# Patient Record
Sex: MALE | Race: AMERICAN INDIAN OR ALASKA NATIVE
[De-identification: names, ages, dates, MRNs, and addresses within clinical notes are randomized per-mention and may not be internally consistent; named-entity substitution may affect disease eponyms.]

---

## 2019-07-27 ENCOUNTER — HOSPITAL ENCOUNTER (EMERGENCY)
Dept: HOSPITAL 5 - ED | Age: 44
Discharge: HOME | End: 2019-07-27
Payer: SELF-PAY

## 2019-07-27 VITALS — DIASTOLIC BLOOD PRESSURE: 78 MMHG | SYSTOLIC BLOOD PRESSURE: 132 MMHG

## 2019-07-27 DIAGNOSIS — K52.9: Primary | ICD-10-CM

## 2019-07-27 DIAGNOSIS — E86.0: ICD-10-CM

## 2019-07-27 LAB
ALBUMIN SERPL-MCNC: 4.3 G/DL (ref 3.9–5)
ALT SERPL-CCNC: 23 UNITS/L (ref 7–56)
BASOPHILS # (AUTO): 0.1 K/MM3 (ref 0–0.1)
BASOPHILS NFR BLD AUTO: 0.8 % (ref 0–1.8)
BILIRUB UR QL STRIP: (no result)
BLOOD UR QL VISUAL: (no result)
BUN SERPL-MCNC: 19 MG/DL (ref 9–20)
BUN/CREAT SERPL: 21 %
CALCIUM SERPL-MCNC: 9.1 MG/DL (ref 8.4–10.2)
EOSINOPHIL # BLD AUTO: 0.4 K/MM3 (ref 0–0.4)
EOSINOPHIL NFR BLD AUTO: 4.9 % (ref 0–4.3)
HCT VFR BLD CALC: 42.8 % (ref 35.5–45.6)
HEMOLYSIS INDEX: 58
HGB BLD-MCNC: 15.3 GM/DL (ref 11.8–15.2)
LYMPHOCYTES # BLD AUTO: 2.9 K/MM3 (ref 1.2–5.4)
LYMPHOCYTES NFR BLD AUTO: 33.3 % (ref 13.4–35)
MCHC RBC AUTO-ENTMCNC: 36 % (ref 32–34)
MCV RBC AUTO: 93 FL (ref 84–94)
MONOCYTES # (AUTO): 0.5 K/MM3 (ref 0–0.8)
MONOCYTES % (AUTO): 5.3 % (ref 0–7.3)
MUCOUS THREADS #/AREA URNS HPF: (no result) /HPF
PH UR STRIP: 6 [PH] (ref 5–7)
PLATELET # BLD: 294 K/MM3 (ref 140–440)
PROT UR STRIP-MCNC: (no result) MG/DL
RBC # BLD AUTO: 4.63 M/MM3 (ref 3.65–5.03)
RBC #/AREA URNS HPF: 3 /HPF (ref 0–6)
UROBILINOGEN UR-MCNC: < 2 MG/DL (ref ?–2)
WBC #/AREA URNS HPF: < 1 /HPF (ref 0–6)

## 2019-07-27 PROCEDURE — 96361 HYDRATE IV INFUSION ADD-ON: CPT

## 2019-07-27 PROCEDURE — 83690 ASSAY OF LIPASE: CPT

## 2019-07-27 PROCEDURE — 96374 THER/PROPH/DIAG INJ IV PUSH: CPT

## 2019-07-27 PROCEDURE — 80053 COMPREHEN METABOLIC PANEL: CPT

## 2019-07-27 PROCEDURE — 36415 COLL VENOUS BLD VENIPUNCTURE: CPT

## 2019-07-27 PROCEDURE — 83735 ASSAY OF MAGNESIUM: CPT

## 2019-07-27 PROCEDURE — 93005 ELECTROCARDIOGRAM TRACING: CPT

## 2019-07-27 PROCEDURE — 99283 EMERGENCY DEPT VISIT LOW MDM: CPT

## 2019-07-27 PROCEDURE — 93010 ELECTROCARDIOGRAM REPORT: CPT

## 2019-07-27 PROCEDURE — 85025 COMPLETE CBC W/AUTO DIFF WBC: CPT

## 2019-07-27 PROCEDURE — 81001 URINALYSIS AUTO W/SCOPE: CPT

## 2019-07-27 NOTE — EMERGENCY DEPARTMENT REPORT
ED N/V/D HPI





- General


Chief complaint: Nausea/Vomiting/Diarrhea


Stated complaint: VOMIT/FAINTED YESTERDAY


Time Seen by Provider: 07/27/19 11:47


Source: patient


Mode of arrival: Ambulatory


Limitations: No Limitations





- History of Present Illness


Initial comments: 





44-year-old male with no past medical or surgical history presents to the 

hospital with complaints of nausea, vomiting, and diarrhea since yesterday.  

Patient works outside in hot weather and typically drinks lots of water.  

Yesterday while at work he had at least 4 episodes of vomiting and then was sent

home.  He continued to have vomiting and diarrhea and states he had a syncopal 

episode with associated lightheadedness.  He denies headache, chest pain, 

shortness of breath, abdominal pain, fevers, melena, hematochezia, hematemesis, 

sick contacts, recent travel, or recent antibiotic use.





- Related Data


                                  Previous Rx's











 Medication  Instructions  Recorded  Last Taken  Type


 


Ondansetron [Zofran Odt] 4 mg PO Q8HR PRN #20 tab.scotty 07/27/19 Unknown Rx











                                    Allergies











Allergy/AdvReac Type Severity Reaction Status Date / Time


 


No Known Allergies Allergy   Unverified 07/27/19 11:01














ED Review of Systems


ROS: 


Stated complaint: VOMIT/FAINTED YESTERDAY


Other details as noted in HPI





Comment: All other systems reviewed and negative





ED Past Medical Hx





- Past Medical History


Previous Medical History?: No





- Surgical History


Past Surgical History?: No





- Social History


Smoking Status: Never Smoker


Substance Use Type: Alcohol





- Medications


Home Medications: 


                                Home Medications











 Medication  Instructions  Recorded  Confirmed  Last Taken  Type


 


Ondansetron [Zofran Odt] 4 mg PO Q8HR PRN #20 tab.raqueldis 07/27/19  Unknown Rx














ED Physical Exam





- General


Limitations: No Limitations





- Other


Other exam information: 





General: No limitations, patient is alert in no acute distress


Head exam: Atraumatic, normocephalic


Eyes exam: Normal appearance, pupils equal reactive to light, extraocular 

movements intact


ENT: Dry mucous membrane


Neck exam: Normal inspection, full range of motion


Respiratory exam: Clear to auscultation bilateral, no wheezes, rales, crackles


Cardiovascular: Normal rate and rhythm, normal heart sounds


Abdomen: Soft, nondistended, and  nontender, with normal bowel sounds, no 

rebound, or guarding


Extremity: Full range of motion normal inspection no deformity


Back: Normal Inspection, full range of motion, no tenderness


Neurologic: Alert, oriented x3, cranial nerves intact, no motor or sensory 

deficit


Psychiatric: normal affect, normal mood


Skin: Warm, dry, intact.  First-degree sunburn skin





ED Course


                                   Vital Signs











  07/27/19 07/27/19 07/27/19





  11:01 12:07 12:09


 


Temperature 97.8 F  


 


Pulse Rate 82  66


 


Respiratory 18  18





Rate   


 


Blood Pressure 129/84  


 


Blood Pressure   121/67





[Left]   


 


O2 Sat by Pulse 95 95 96





Oximetry   














  07/27/19 07/27/19 07/27/19





  12:10 12:16 12:30


 


Temperature   


 


Pulse Rate   


 


Respiratory 18  





Rate   


 


Blood Pressure  121/67 121/67


 


Blood Pressure   





[Left]   


 


O2 Sat by Pulse 96 96 98





Oximetry   














  07/27/19 07/27/19 07/27/19





  12:46 13:00 13:16


 


Temperature   


 


Pulse Rate 68 54 L 52 L


 


Respiratory 23 15 19





Rate   


 


Blood Pressure 117/76 117/76 126/84


 


Blood Pressure   





[Left]   


 


O2 Sat by Pulse 95 98 98





Oximetry   














  07/27/19 07/27/19 07/27/19





  13:30 13:46 14:00


 


Temperature   


 


Pulse Rate 64 64 56 L


 


Respiratory 18 21 15





Rate   


 


Blood Pressure 126/84 129/62 134/72


 


Blood Pressure   





[Left]   


 


O2 Sat by Pulse 96 97 97





Oximetry   














  07/27/19 07/27/19 07/27/19





  14:16 14:30 14:46


 


Temperature   


 


Pulse Rate 72 61 


 


Respiratory 17 21 30 H





Rate   


 


Blood Pressure 129/62 138/82 138/82


 


Blood Pressure   





[Left]   


 


O2 Sat by Pulse 98 96 96





Oximetry   














  07/27/19 07/27/19





  15:00 15:16


 


Temperature  


 


Pulse Rate  


 


Respiratory  





Rate  


 


Blood Pressure 131/89 131/89


 


Blood Pressure  





[Left]  


 


O2 Sat by Pulse 98 95





Oximetry  














ED Medical Decision Making





- Lab Data


Result diagrams: 


                                 07/27/19 11:51





                                 07/27/19 11:51








                                   Lab Results











  07/27/19 07/27/19 07/27/19 Range/Units





  11:51 11:51 11:51 


 


WBC  8.8    (4.5-11.0)  K/mm3


 


RBC  4.63    (3.65-5.03)  M/mm3


 


Hgb  15.3 H    (11.8-15.2)  gm/dl


 


Hct  42.8    (35.5-45.6)  %


 


MCV  93    (84-94)  fl


 


MCH  33 H    (28-32)  pg


 


MCHC  36 H    (32-34)  %


 


RDW  13.6    (13.2-15.2)  %


 


Plt Count  294    (140-440)  K/mm3


 


Lymph % (Auto)  33.3    (13.4-35.0)  %


 


Mono % (Auto)  5.3    (0.0-7.3)  %


 


Eos % (Auto)  4.9 H    (0.0-4.3)  %


 


Baso % (Auto)  0.8    (0.0-1.8)  %


 


Lymph #  2.9    (1.2-5.4)  K/mm3


 


Mono #  0.5    (0.0-0.8)  K/mm3


 


Eos #  0.4    (0.0-0.4)  K/mm3


 


Baso #  0.1    (0.0-0.1)  K/mm3


 


Seg Neutrophils %  55.7    (40.0-70.0)  %


 


Seg Neutrophils #  4.9    (1.8-7.7)  K/mm3


 


Sodium   140   (137-145)  mmol/L


 


Potassium   4.6   (3.6-5.0)  mmol/L


 


Chloride   107.3 H   ()  mmol/L


 


Carbon Dioxide   22   (22-30)  mmol/L


 


Anion Gap   15   mmol/L


 


BUN   19   (9-20)  mg/dL


 


Creatinine   0.9   (0.8-1.5)  mg/dL


 


Estimated GFR   > 60   ml/min


 


BUN/Creatinine Ratio   21   %


 


Glucose   96   ()  mg/dL


 


Calcium   9.1   (8.4-10.2)  mg/dL


 


Magnesium    2.20  (1.7-2.3)  mg/dL


 


Total Bilirubin   0.40   (0.1-1.2)  mg/dL


 


AST   21   (5-40)  units/L


 


ALT   23   (7-56)  units/L


 


Alkaline Phosphatase   100   ()  units/L


 


Total Protein   7.2   (6.3-8.2)  g/dL


 


Albumin   4.3   (3.9-5)  g/dL


 


Albumin/Globulin Ratio   1.5   %


 


Lipase   30   (13-60)  units/L


 


Urine Color     (Yellow)  


 


Urine Turbidity     (Clear)  


 


Urine pH     (5.0-7.0)  


 


Ur Specific Gravity     (1.003-1.030)  


 


Urine Protein     (Negative)  mg/dL


 


Urine Glucose (UA)     (Negative)  mg/dL


 


Urine Ketones     (Negative)  mg/dL


 


Urine Blood     (Negative)  


 


Urine Nitrite     (Negative)  


 


Urine Bilirubin     (Negative)  


 


Urine Urobilinogen     (<2.0)  mg/dL


 


Ur Leukocyte Esterase     (Negative)  


 


Urine WBC (Auto)     (0.0-6.0)  /HPF


 


Urine RBC (Auto)     (0.0-6.0)  /HPF


 


Urine Mucus     /HPF














  07/27/19 Range/Units





  14:41 


 


WBC   (4.5-11.0)  K/mm3


 


RBC   (3.65-5.03)  M/mm3


 


Hgb   (11.8-15.2)  gm/dl


 


Hct   (35.5-45.6)  %


 


MCV   (84-94)  fl


 


MCH   (28-32)  pg


 


MCHC   (32-34)  %


 


RDW   (13.2-15.2)  %


 


Plt Count   (140-440)  K/mm3


 


Lymph % (Auto)   (13.4-35.0)  %


 


Mono % (Auto)   (0.0-7.3)  %


 


Eos % (Auto)   (0.0-4.3)  %


 


Baso % (Auto)   (0.0-1.8)  %


 


Lymph #   (1.2-5.4)  K/mm3


 


Mono #   (0.0-0.8)  K/mm3


 


Eos #   (0.0-0.4)  K/mm3


 


Baso #   (0.0-0.1)  K/mm3


 


Seg Neutrophils %   (40.0-70.0)  %


 


Seg Neutrophils #   (1.8-7.7)  K/mm3


 


Sodium   (137-145)  mmol/L


 


Potassium   (3.6-5.0)  mmol/L


 


Chloride   ()  mmol/L


 


Carbon Dioxide   (22-30)  mmol/L


 


Anion Gap   mmol/L


 


BUN   (9-20)  mg/dL


 


Creatinine   (0.8-1.5)  mg/dL


 


Estimated GFR   ml/min


 


BUN/Creatinine Ratio   %


 


Glucose   ()  mg/dL


 


Calcium   (8.4-10.2)  mg/dL


 


Magnesium   (1.7-2.3)  mg/dL


 


Total Bilirubin   (0.1-1.2)  mg/dL


 


AST   (5-40)  units/L


 


ALT   (7-56)  units/L


 


Alkaline Phosphatase   ()  units/L


 


Total Protein   (6.3-8.2)  g/dL


 


Albumin   (3.9-5)  g/dL


 


Albumin/Globulin Ratio   %


 


Lipase   (13-60)  units/L


 


Urine Color  Yellow  (Yellow)  


 


Urine Turbidity  Clear  (Clear)  


 


Urine pH  6.0  (5.0-7.0)  


 


Ur Specific Gravity  1.020  (1.003-1.030)  


 


Urine Protein  <15 mg/dl  (Negative)  mg/dL


 


Urine Glucose (UA)  Neg  (Negative)  mg/dL


 


Urine Ketones  Neg  (Negative)  mg/dL


 


Urine Blood  Neg  (Negative)  


 


Urine Nitrite  Neg  (Negative)  


 


Urine Bilirubin  Neg  (Negative)  


 


Urine Urobilinogen  < 2.0  (<2.0)  mg/dL


 


Ur Leukocyte Esterase  Neg  (Negative)  


 


Urine WBC (Auto)  < 1.0  (0.0-6.0)  /HPF


 


Urine RBC (Auto)  3.0  (0.0-6.0)  /HPF


 


Urine Mucus  Few  /HPF














- EKG Data


-: EKG Interpreted by Me


EKG shows normal: sinus rhythm, ST-T waves (no stemi)


Rate: normal





- EKG Data


When compared to previous EKG there are: previous EKG unavailable





- Medical Decision Making





Patient required greater than 2 L of normal saline to produce urine.  He feels 

much better with hydration and Zofran and is tolerating by mouth intake.  

Discharged and treated for gastroenteritis.





- Differential Diagnosis


gastroenteritis, pancreatitis, dehydration, arrhythmia, electrolyte abnorma


Critical Care Time: No


Critical care attestation.: 


If time is entered above; I have spent that time in minutes in the direct care 

of this critically ill patient, excluding procedure time.








ED Disposition


Clinical Impression: 


 Gastroenteritis, Dehydration





Disposition: DC-01 TO HOME OR SELFCARE


Is pt being admited?: No


Does the pt Need Aspirin: No


Condition: Stable


Instructions:  Gastroenteritis (ED), Dehydration (ED)


Additional Instructions: 


Take the medication as prescribed.  Follow up with your doctor or the 

clinic/doctor provided.  Return if symptoms worsen as indicated by your disc

harge instructions


Prescriptions: 


Ondansetron [Zofran Odt] 4 mg PO Q8HR PRN #20 tab.rapdis


 PRN Reason: Nausea And Vomiting


Referrals: 


[facility], [place] [Primary Care Provider] - 3-5 Days


Time of Disposition: 16:06

## 2021-12-20 ENCOUNTER — OFFICE VISIT (OUTPATIENT)
Dept: URGENT CARE | Facility: CLINIC | Age: 46
End: 2021-12-20
Payer: COMMERCIAL

## 2021-12-20 VITALS
HEIGHT: 74 IN | HEART RATE: 86 BPM | RESPIRATION RATE: 14 BRPM | OXYGEN SATURATION: 98 % | WEIGHT: 226 LBS | SYSTOLIC BLOOD PRESSURE: 122 MMHG | BODY MASS INDEX: 29 KG/M2 | TEMPERATURE: 98.9 F | DIASTOLIC BLOOD PRESSURE: 86 MMHG

## 2021-12-20 DIAGNOSIS — U07.1 COVID-19: ICD-10-CM

## 2021-12-20 DIAGNOSIS — R05.9 COUGH: ICD-10-CM

## 2021-12-20 DIAGNOSIS — R68.89 FLU-LIKE SYMPTOMS: ICD-10-CM

## 2021-12-20 LAB
EXTERNAL QUALITY CONTROL: NORMAL
FLUAV+FLUBV AG SPEC QL IA: NEGATIVE
INT CON NEG: NEGATIVE
INT CON POS: POSITIVE
SARS-COV+SARS-COV-2 AG RESP QL IA.RAPID: POSITIVE

## 2021-12-20 PROCEDURE — 99204 OFFICE O/P NEW MOD 45 MIN: CPT | Mod: CS | Performed by: PHYSICIAN ASSISTANT

## 2021-12-20 PROCEDURE — 87426 SARSCOV CORONAVIRUS AG IA: CPT | Mod: QW | Performed by: PHYSICIAN ASSISTANT

## 2021-12-20 PROCEDURE — 87804 INFLUENZA ASSAY W/OPTIC: CPT | Performed by: PHYSICIAN ASSISTANT

## 2021-12-20 RX ORDER — BENZONATATE 100 MG/1
100 CAPSULE ORAL 3 TIMES DAILY PRN
Qty: 60 CAPSULE | Refills: 0 | Status: SHIPPED | OUTPATIENT
Start: 2021-12-20

## 2021-12-20 RX ORDER — DEXTROMETHORPHAN HYDROBROMIDE AND PROMETHAZINE HYDROCHLORIDE 15; 6.25 MG/5ML; MG/5ML
5 SYRUP ORAL
Qty: 50 ML | Refills: 0 | Status: SHIPPED | OUTPATIENT
Start: 2021-12-20

## 2021-12-20 ASSESSMENT — ENCOUNTER SYMPTOMS
CHILLS: 1
SORE THROAT: 1
SHORTNESS OF BREATH: 0
SINUS PAIN: 1
CONSTIPATION: 0
VOMITING: 0
MYALGIAS: 1
COUGH: 1
SPUTUM PRODUCTION: 1
ABDOMINAL PAIN: 0
WHEEZING: 0
FEVER: 1
DIARRHEA: 0
HEADACHES: 1

## 2021-12-20 NOTE — LETTER
December 20, 2021  Gerry Skelton   Your employee was seen in our clinic today.  COVID-19 testing done in Urgent Care today was POSITIVE. We are asking you to excuse absences while following self-isolation protocol per Center for Disease Control (CDC) guidelines.  Your employee will be able to access test results through our electronic delivery system called [employer].     If the results of testing are positive, your employee should follow the CDC guidelines.  These are isolation for a minimum of 10 days and at least 24 hours have passed since your last fever without the use of fever-reducing medications and all other symptoms have improved.  The health department may contact you and provide further directions regarding self-isolation and return to work.     Negative result without close contact*:  If your employee was tested due to their symptoms without close contact to someone with COVID-19 and their test is negative: your employee should not return to work or regular activities until 24 hours after symptoms fully improve. (For example, if patient feels back to normal on Tuesday, should remain isolated through Wednesday).      Negative with close contact*:  If your employee was tested due to close contact to someone, they should self isolate for 10 days after their exposure.    Negative with positive household member  If your employee was due to a positive household member they should still quarantine for a period of 10 days.  The 10 day period begins once the household member is isolated. If unable to quarantine (for example mom from infant), the CDC advises an additional 10-day quarantine period from the COVID-19 household member.   In general, repeat testing is not necessary and not offered through our [facility].     This is the only note that will be provided from [facility] for this visit.  Your employee will require an appointment with a primary care provider if FMLA or disability forms  are required.  If you have any questions please do not hesitate to call me at the phone number listed below.    Sincerely,  TERENCE Suh.-C.  [phone]

## 2021-12-20 NOTE — PROGRESS NOTES
"Subjective:   Gerry Skelton is a 46 y.o. male who presents for Headache (x 4 days with fatigue, cough, nasal congestion, \"eyes\" pain, chills and fever.  Unvaccinated for Covid 19. )      HPI  46 y.o. male presents to urgent care with new problem to provider of headache, cough, congestion, fatigue, chills, and subjective fevers onset 4 days ago.  Patient denies chest pain or shortness of breath.  He denies abdominal pain, vomiting, or diarrhea.  He denies sick contacts or confirmed exposure to COVID-19.  Patient is not vaccinated for COVID. Denies other associated aggravating or alleviating factors.     Review of Systems   Constitutional: Positive for chills, fever and malaise/fatigue.   HENT: Positive for congestion, sinus pain and sore throat. Negative for ear pain.    Respiratory: Positive for cough and sputum production. Negative for shortness of breath and wheezing.    Cardiovascular: Negative for chest pain.   Gastrointestinal: Negative for abdominal pain, constipation, diarrhea and vomiting.   Musculoskeletal: Positive for myalgias.   Skin: Negative for rash.   Neurological: Positive for headaches.       There is no problem list on file for this patient.    History reviewed. No pertinent surgical history.  Social History     Tobacco Use   • Smoking status: Current Every Day Smoker   • Smokeless tobacco: Never Used   Vaping Use   • Vaping Use: Never used   Substance Use Topics   • Alcohol use: Yes   • Drug use: Never      History reviewed. No pertinent family history.   (Allergies, Medications, & Tobacco/Substance Use were reconciled by the Medical Assistant and reviewed by myself. The family history is prepopulated)     Objective:     /86   Pulse 86   Temp 37.2 °C (98.9 °F) (Temporal)   Resp 14   Ht 1.88 m (6' 2\")   Wt 103 kg (226 lb)   SpO2 98%   BMI 29.02 kg/m²     Physical Exam  Vitals reviewed.   Constitutional:       General: He is not in acute distress.     Appearance: Normal " appearance. He is not ill-appearing or diaphoretic.   HENT:      Head: Normocephalic and atraumatic.      Nose: Nose normal.      Mouth/Throat:      Mouth: Mucous membranes are moist.      Pharynx: Posterior oropharyngeal erythema present. No oropharyngeal exudate.   Eyes:      Conjunctiva/sclera: Conjunctivae normal.   Cardiovascular:      Rate and Rhythm: Normal rate and regular rhythm.      Heart sounds: Normal heart sounds. No murmur heard.  No friction rub. No gallop.    Pulmonary:      Effort: Pulmonary effort is normal. No respiratory distress.      Breath sounds: Normal breath sounds. No wheezing, rhonchi or rales.   Musculoskeletal:         General: Normal range of motion.      Cervical back: Normal range of motion and neck supple.   Lymphadenopathy:      Cervical: Cervical adenopathy present.   Skin:     General: Skin is warm and dry.      Findings: No rash.   Neurological:      General: No focal deficit present.      Mental Status: He is alert and oriented to person, place, and time.   Psychiatric:         Mood and Affect: Mood normal.         Behavior: Behavior normal.         Thought Content: Thought content normal.         Judgment: Judgment normal.         Assessment/Plan:     1. COVID-19  POCT SARS-COV Antigen RITA (Symptomatic Only)    CANCELED: COVID/SARS CoV-2 PCR   2. Flu-like symptoms  POCT Influenza A/B   3. Cough  benzonatate (TESSALON) 100 MG Cap    promethazine-dextromethorphan (PROMETHAZINE-DM) 6.25-15 MG/5ML syrup     Results for orders placed or performed in visit on 12/20/21   POCT SARS-COV Antigen RITA (Symptomatic Only)   Result Value Ref Range    Internal  Valid     SARS-COV ANTIGEN RITA Positive    POCT Influenza A/B   Result Value Ref Range    Rapid Influenza A-B Negative     Internal Control Positive Positive     Internal Control Negative Negative      Patient is positive for COVID-19.  He has had 4 days of symptoms.  Patient instructed to continue  self-isolate/quarantine per CDC guidelines. patient may obtain hard copy of results on [facility]. Increased fluids and rest. Discussed use of OTC cough and cold medication and Tylenol/Motrin for symptomatic relief.  Return for reevaluation or proceed to ED if symptoms persist or worsen. Supportive care, differential diagnoses, and indications for immediate follow-up discussed with patient. Patient should to proceed to ED for development of symptoms including but not limited to shortness of breath breath, difficulty breathing, or worsening symptoms not manageable at home.   Vital signs stable, patient in no acute respiratory distress.  COVID-19 discharge instructions and CDC guidelines provided to patient in AVS.      Differential diagnosis, natural history, supportive care, and indications for immediate follow-up discussed.    Advised the patient to follow-up with the primary care physician for recheck, reevaluation, and consideration of further management.  Patient verbalized understanding of treatment plan and has no further questions regarding care.   This patient is evaluated under [facility] isolation protocols in urgent care.  Out of an abundance of caution I am wearing a N95 mask, protective eye gear, and gloves through all interaction with patient.    I personally reviewed prior external notes and test results pertinent to today's visit.     Please note that this dictation was created using voice recognition software. I have made a reasonable attempt to correct obvious errors, but I expect that there are errors of grammar and possibly content that I did not discover before finalizing the note.    This note was electronically signed by Shana Hardwick PA-C

## 2022-02-15 ENCOUNTER — APPOINTMENT (OUTPATIENT)
Dept: RADIOLOGY | Facility: MEDICAL CENTER | Age: 47
End: 2022-02-15
Attending: EMERGENCY MEDICINE
Payer: COMMERCIAL

## 2022-02-15 ENCOUNTER — HOSPITAL ENCOUNTER (EMERGENCY)
Facility: MEDICAL CENTER | Age: 47
End: 2022-02-15
Attending: EMERGENCY MEDICINE
Payer: COMMERCIAL

## 2022-02-15 VITALS
OXYGEN SATURATION: 98 % | DIASTOLIC BLOOD PRESSURE: 86 MMHG | BODY MASS INDEX: 26.82 KG/M2 | HEIGHT: 74 IN | SYSTOLIC BLOOD PRESSURE: 134 MMHG | TEMPERATURE: 97.2 F | HEART RATE: 74 BPM | WEIGHT: 209 LBS | RESPIRATION RATE: 15 BRPM

## 2022-02-15 DIAGNOSIS — S51.811A LACERATION OF RIGHT FOREARM, INITIAL ENCOUNTER: ICD-10-CM

## 2022-02-15 DIAGNOSIS — S92.534A CLOSED NONDISPLACED FRACTURE OF DISTAL PHALANX OF LESSER TOE OF RIGHT FOOT, INITIAL ENCOUNTER: ICD-10-CM

## 2022-02-15 PROCEDURE — 700111 HCHG RX REV CODE 636 W/ 250 OVERRIDE (IP): Performed by: EMERGENCY MEDICINE

## 2022-02-15 PROCEDURE — 73090 X-RAY EXAM OF FOREARM: CPT | Mod: RT

## 2022-02-15 PROCEDURE — 90471 IMMUNIZATION ADMIN: CPT

## 2022-02-15 PROCEDURE — 73660 X-RAY EXAM OF TOE(S): CPT | Mod: RT

## 2022-02-15 PROCEDURE — 99283 EMERGENCY DEPT VISIT LOW MDM: CPT

## 2022-02-15 PROCEDURE — 90715 TDAP VACCINE 7 YRS/> IM: CPT | Performed by: EMERGENCY MEDICINE

## 2022-02-15 PROCEDURE — 73110 X-RAY EXAM OF WRIST: CPT | Mod: RT

## 2022-02-15 RX ADMIN — CLOSTRIDIUM TETANI TOXOID ANTIGEN (FORMALDEHYDE INACTIVATED), CORYNEBACTERIUM DIPHTHERIAE TOXOID ANTIGEN (FORMALDEHYDE INACTIVATED), BORDETELLA PERTUSSIS TOXOID ANTIGEN (GLUTARALDEHYDE INACTIVATED), BORDETELLA PERTUSSIS FILAMENTOUS HEMAGGLUTININ ANTIGEN (FORMALDEHYDE INACTIVATED), BORDETELLA PERTUSSIS PERTACTIN ANTIGEN, AND BORDETELLA PERTUSSIS FIMBRIAE 2/3 ANTIGEN 0.5 ML: 5; 2; 2.5; 5; 3; 5 INJECTION, SUSPENSION INTRAMUSCULAR at 13:24

## 2022-02-15 NOTE — ED PROVIDER NOTES
ED Provider Note    Scribed for Shahla Gibson M.D. by Sina Allen. 2/15/2022  12:34 PM    Means of arrival: Walk-in  History obtained from: Patient  History limited by: None    CHIEF COMPLAINT  Chief Complaint   Patient presents with   • Wound Check     HPI  Gerry Skelton is a 46 y.o. male who presents to the Emergency Department for a right forearm laceration that occurred yesterday afternoon. He states that he was at work when his right arm got smashed between two pieces of metal. He also states that a piece of metal fell and landed onto his right foot as well. He reports associated right foot pain, right arm pain, right little finger numbness, and inability to extend his little finger. The patient's arm was wrapped on arrival. No exacerbating factors were identified. He denies associated numbness and tingling in the rest of his hand. He states he has not had a recent tetanus shot.    REVIEW OF SYSTEMS  Pertinent positive include right forearm laceration, right foot pain, right arm pain, right little finger numbness, and unable to extend little finger.   Pertinent negative include numbness or tingling in his other digits.     PAST MEDICAL HISTORY   None noted.    SOCIAL HISTORY  Social History     Tobacco Use   • Smoking status: Current Every Day Smoker   • Smokeless tobacco: Never Used   Vaping Use   • Vaping Use: Never used   Substance and Sexual Activity   • Alcohol use: Yes   • Drug use: Never   • Sexual activity: Not noted.     SURGICAL HISTORY  patient denies any surgical history    CURRENT MEDICATIONS  Home Medications     Reviewed by Hemalatha Edward R.N. (Registered Nurse) on 02/15/22 at 1204  Med List Status: <None>   Medication Last Dose Status   benzonatate (TESSALON) 100 MG Cap  Active   promethazine-dextromethorphan (PROMETHAZINE-DM) 6.25-15 MG/5ML syrup  Active              ALLERGIES  No Known Allergies    PHYSICAL EXAM   VITAL SIGNS: /89   Pulse 75   Temp 36.1 °C (97 °F)  "(Temporal)   Resp 16   Ht 1.88 m (6' 2\")   Wt 94.8 kg (208 lb 15.9 oz)   SpO2 99%   BMI 26.83 kg/m²    Constitutional: Nontoxic appearing middle-aged male.  Alert in no apparent distress.  HENT: Normocephalic, Atraumatic. Bilateral external ears normal. Nose normal.  Moist mucous membranes.  Oropharynx clear.  Eyes: Pupils are equal and reactive. Conjunctiva normal.   Neck: Supple, full range of motion  Musculoskeletal: 3cm linear full thickness laceration to the right forearm.  No visualized foreign body or underlying tendon injury.  Unable to fully extend fifth finger with some weakness and diminished sensation in ulnar nerve distribution. Strength intact in median and radial nerve distribution. No lower extremity edema.  Right fifth toe with swelling and bruising.  Skin: Warm, Dry.  No erythema, No rash.   Neurologic: Alert and oriented x3. Unable to fully extend fifth finger at DIP joint and diminished sensation in ulnar nerve distribution.  Psychiatric: Affect normal, Mood normal, Appears appropriate and not intoxicated.    DIAGNOSTIC STUDIES    RADIOLOGY  Personally reviewed by me  DX-WRIST-COMPLETE 3+ RIGHT   Final Result      No evidence of acute fracture or dislocation.      DX-TOE(S) 2+ RIGHT   Final Result      1.  Minimally angulated extra-articular fracture of the distal aspect of the proximal phalanx of the fifth digit.   2.  Soft tissue swelling of the fifth digit.      DX-FOREARM RIGHT   Final Result      1.  No evidence of acute fracture or dislocation.   2.  Laceration in the dorsal distal forearm.   3.  No radiopaque foreign body is identified.        ED COURSE  Vitals:    02/15/22 1146 02/15/22 1203 02/15/22 1500   BP: 139/89  134/86   Pulse: 75  74   Resp: 16  15   Temp: 36.1 °C (97 °F)  36.2 °C (97.2 °F)   TempSrc: Temporal  Temporal   SpO2: 99%  98%   Weight:  94.8 kg (208 lb 15.9 oz)    Height: 1.88 m (6' 2\")       Medications administered:  Medications   tetanus-dipth-acell pertussis " (ADACEL) inj 0.5 mL (0.5 mL Intramuscular Given 2/15/22 1324)       12:34 PM Patient seen and examined at bedside. The patient presents with a right forearm laceration. Ordered for DX-right forearm and DX-right toes to evaluate. Patient will be treated with Adacel 0.5ml for his symptoms.     MEDICAL DECISION MAKING  Patient presents with laceration/crush injury to the right forearm and right toe which occurred approximately 24 hours ago.  He has normal vital signs on arrival and no other traumatic injuries are identified.  The patient has a full-thickness laceration overlying the forearm however does not violate the fascia and does not have any evidence of foreign body or tendon injury.  He is however unable to fully extend his fifth finger which may be concerning for tendon injury.  X-rays are negative for underlying fracture.  As the laceration is approximately 24 hours old therefore will not be sutured however has good approximation with Steri-Strips.  X-rays of the right toe does show evidence of fracture that will be oanh taped.    1:55 PM - I discussed the patient's case and the above findings with Dr. Rincon (Ortho) who believes it is unclear if there is a present tendon injury. Would like the patient to follow up with outpatient and no splinting required.    3:20 PM - Upon reassessment, patient is resting comfortably with normal vital signs.  No new complaints at this time.  Discussed results with patient and/or family as well as importance of primary care follow up.  Patient understands plan of care and strict return precautions for new or changing symptoms.     The patient is referred to a primary physician for blood pressure management, diabetic screening, and for all other preventative health concerns.    DISPOSITION:  Patient will be discharged home in stable condition.    FOLLOW UP:  Pop Rincon M.D.  [address] [place] [address]  [phone]    Schedule an appointment as soon as  possible for a visit   ortho/hand follow up    [facility], Emergency Dept  [address] [facility] [address] [phone]    If symptoms worsen    IMPRESSION  (S51.811A) Laceration of right forearm, initial encounter  (S92.864X) Closed nondisplaced fracture of distal phalanx of lesser toe of right foot, initial encounter    Results, diagnoses, and treatment options were discussed with the patient and/or family. Patient verbalized understanding of plan of care.     Sina RODRIGUEZ (Netoibe), am scribing for, and in the presence of, Shahla Gibson M.D..    Electronically signed by: Sina Allen (Netoibhandy), 2/15/2022    Shahla RODRIGUEZ M.D. personally performed the services described in this documentation, as scribed by Sina Allen in my presence, and it is both accurate and complete. E.    The note accurately reflects work and decisions made by me.  Shahla Gibson M.D.  2/15/2022  8:00 PM

## 2022-02-15 NOTE — DISCHARGE INSTRUCTIONS
You were seen in the Emergency Department for arm and toe injury.    X-rays were normal of the right arm and right wrist.  Right toe did show a small fracture.  Please keep tape in place for the next few weeks this should heal without issue.    Please use 1,000mg of tylenol or 600mg of ibuprofen every 6 hours as needed for pain.    Please follow up with orthopedic hand specialist regarding difficulty with straightening your fifth finger as this may be a tendon injury.    Return to the Emergency Department with other concerns.

## 2022-02-15 NOTE — ED TRIAGE NOTES
Patient smashed his arm between two pieces of metal and a laceration is noted.  Wound is currently wrapped.  Happened yesterday.

## 2022-02-15 NOTE — ED NOTES
Patient provided with discharge instructions. Patient verbalized understanding. All questions answered. Patient assisted out of the ED with steady gait.

## 2024-09-13 ENCOUNTER — OFFICE VISIT (OUTPATIENT)
Dept: URGENT CARE | Facility: PHYSICIAN GROUP | Age: 49
End: 2024-09-13
Payer: COMMERCIAL

## 2024-09-13 VITALS
SYSTOLIC BLOOD PRESSURE: 110 MMHG | WEIGHT: 234.8 LBS | RESPIRATION RATE: 16 BRPM | OXYGEN SATURATION: 95 % | HEART RATE: 74 BPM | DIASTOLIC BLOOD PRESSURE: 64 MMHG | BODY MASS INDEX: 29.19 KG/M2 | TEMPERATURE: 98.4 F | HEIGHT: 75 IN

## 2024-09-13 DIAGNOSIS — G43.809 OTHER MIGRAINE WITHOUT STATUS MIGRAINOSUS, NOT INTRACTABLE: ICD-10-CM

## 2024-09-13 DIAGNOSIS — H53.9 VISION CHANGES: ICD-10-CM

## 2024-09-13 PROCEDURE — 3078F DIAST BP <80 MM HG: CPT | Performed by: PHYSICIAN ASSISTANT

## 2024-09-13 PROCEDURE — 3074F SYST BP LT 130 MM HG: CPT | Performed by: PHYSICIAN ASSISTANT

## 2024-09-13 PROCEDURE — 99215 OFFICE O/P EST HI 40 MIN: CPT | Performed by: PHYSICIAN ASSISTANT

## 2024-09-13 ASSESSMENT — ENCOUNTER SYMPTOMS
FATIGUE: 1
HEADACHES: 1
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
